# Patient Record
Sex: FEMALE | Race: WHITE | NOT HISPANIC OR LATINO
[De-identification: names, ages, dates, MRNs, and addresses within clinical notes are randomized per-mention and may not be internally consistent; named-entity substitution may affect disease eponyms.]

---

## 2019-08-21 ENCOUNTER — APPOINTMENT (OUTPATIENT)
Dept: VASCULAR SURGERY | Facility: CLINIC | Age: 79
End: 2019-08-21
Payer: MEDICARE

## 2019-08-21 DIAGNOSIS — I87.2 VENOUS INSUFFICIENCY (CHRONIC) (PERIPHERAL): ICD-10-CM

## 2019-08-21 DIAGNOSIS — I83.893 VARICOSE VEINS OF BILATERAL LOWER EXTREMITIES WITH OTHER COMPLICATIONS: ICD-10-CM

## 2019-08-21 DIAGNOSIS — I86.8 VARICOSE VEINS OF OTHER SPECIFIED SITES: ICD-10-CM

## 2019-08-21 PROCEDURE — 93970 EXTREMITY STUDY: CPT

## 2019-08-21 PROCEDURE — 99204 OFFICE O/P NEW MOD 45 MIN: CPT

## 2019-08-22 RX ORDER — CITALOPRAM HYDROBROMIDE 40 MG/1
40 TABLET, FILM COATED ORAL
Refills: 0 | Status: ACTIVE | COMMUNITY

## 2019-08-22 RX ORDER — PANTOPRAZOLE SODIUM 40 MG/1
40 TABLET, DELAYED RELEASE ORAL
Refills: 0 | Status: ACTIVE | COMMUNITY

## 2019-08-22 RX ORDER — MULTIVITAMIN
TABLET ORAL
Refills: 0 | Status: ACTIVE | COMMUNITY

## 2019-08-22 RX ORDER — ZOLPIDEM TARTRATE 10 MG/1
10 TABLET, FILM COATED ORAL
Refills: 0 | Status: ACTIVE | COMMUNITY

## 2019-08-22 RX ORDER — CALCIUM CARBONATE/VITAMIN D3 600 MG-20
TABLET ORAL
Refills: 0 | Status: ACTIVE | COMMUNITY

## 2019-08-22 RX ORDER — ROSUVASTATIN CALCIUM 20 MG/1
20 TABLET, FILM COATED ORAL
Refills: 0 | Status: ACTIVE | COMMUNITY

## 2019-08-22 RX ORDER — MULTIVIT-MIN/FOLIC/VIT K/LYCOP 400-300MCG
25 MCG TABLET ORAL
Refills: 0 | Status: ACTIVE | COMMUNITY

## 2019-08-26 NOTE — END OF VISIT
[FreeTextEntry3] : All medical record entries made by the Scribe were at my, Dr. Cage's direction and personally dictated by me on 08/21/2019 I have reviewed the chart and agree that the record accurately reflects my personal performance of the history, physical exam, assessment and plan. I have also personally directed, reviewed, and agreed with the chart.\par

## 2019-08-26 NOTE — ASSESSMENT
[FreeTextEntry1] : 78 y/o F with symptomatic bulging varicose veins and spider veins. On exam, she has more prominent bulging vv on medial left leg down to calf. SPider veins are scattered throughout both legs. BLE Venous Duplex completed today shows no signs of DVT bilaterally. Right leg SSV with reflux. LT GSV not visualized. VV noted on thigh to calf. Recommended L Stab phlebectomy but patient declines as she does not want to undergo anesthesia. Patient also concerned with her multiple scattered spider veins throughout her bilateral legs. Recommended Sclerotherapy to treat these. Informed patient that the treatment is cosmetic and will not be covered by insurance. Risk, benefits, and alternatives to the proposed procedure were discussed with the patient and all questions were answered to their satisfaction. Patient understands but declines to undergo the proposed procedure at this time. She will let us know if she would like to proceed with sclero. Follow up here PRN.

## 2019-08-26 NOTE — PHYSICAL EXAM
[Respiratory Effort] : normal respiratory effort [No Rash or Lesion] : No rash or lesion [Alert] : alert [Oriented to Person] : oriented to person [Oriented to Place] : oriented to place [Oriented to Time] : oriented to time [Calm] : calm [2+] : left 2+ [Varicose Veins Of Lower Extremities] : bilaterally [Ankle Swelling On The Left] : moderate [JVD] : no jugular venous distention  [Normal Breath Sounds] : Normal breath sounds [Ankle Swelling (On Exam)] : not present [] : not present [de-identified] : Well appearing, NAD [de-identified] : NCAT [de-identified] : Clear [FreeTextEntry1] : Varicose veins on bilateral legs, more prominent on Medial left leg with various scattered spider veins on bilateral legs. [de-identified] : FROM

## 2019-08-26 NOTE — ADDENDUM
[FreeTextEntry1] : Documented by Eva Krause acting as a scribe for Dr. Erwin Cage on 08/21/2019\par

## 2019-08-26 NOTE — PROCEDURE
[FreeTextEntry1] : BLE Venous Duplex completed today shows no signs of DVT bilaterally. Right leg SSV with reflux. LT GSV not visualized. VV noted on thigh to calf.

## 2019-08-26 NOTE — HISTORY OF PRESENT ILLNESS
[FreeTextEntry1] : 78 y/o F presents here today for initial evaluation of varicose veins and spider veins. She was referred here by a friend of hers who is another pt of mine. She locates the veins on her left leg. She states that she feels pain and pressure on the veins, and that they are prominent and bulge, worse at the end of the day. Patient is also concerned about spider veins that she locates throughout her bilateral legs. Denies any history of bleeding/clotting disorders or previous vein procedures.\par \par Off note, patient highly against undergoing general anesthesia. 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

## 2019-10-02 ENCOUNTER — APPOINTMENT (OUTPATIENT)
Dept: INTERNAL MEDICINE | Facility: CLINIC | Age: 79
End: 2019-10-02

## 2022-09-01 ENCOUNTER — OUTPATIENT (OUTPATIENT)
Dept: OUTPATIENT SERVICES | Facility: HOSPITAL | Age: 82
LOS: 1 days | End: 2022-09-01
Payer: MEDICARE

## 2022-09-01 DIAGNOSIS — R07.9 CHEST PAIN, UNSPECIFIED: ICD-10-CM

## 2022-09-01 DIAGNOSIS — I25.9 CHRONIC ISCHEMIC HEART DISEASE, UNSPECIFIED: ICD-10-CM

## 2022-09-01 PROCEDURE — 93018 CV STRESS TEST I&R ONLY: CPT

## 2022-09-01 PROCEDURE — 93017 CV STRESS TEST TRACING ONLY: CPT

## 2022-09-01 PROCEDURE — 93016 CV STRESS TEST SUPVJ ONLY: CPT

## 2022-09-01 PROCEDURE — 78452 HT MUSCLE IMAGE SPECT MULT: CPT | Mod: 26,MH

## 2022-09-01 PROCEDURE — 78452 HT MUSCLE IMAGE SPECT MULT: CPT

## 2022-09-01 PROCEDURE — A9500: CPT
